# Patient Record
Sex: MALE | Race: ASIAN | NOT HISPANIC OR LATINO | URBAN - METROPOLITAN AREA
[De-identification: names, ages, dates, MRNs, and addresses within clinical notes are randomized per-mention and may not be internally consistent; named-entity substitution may affect disease eponyms.]

---

## 2021-12-22 ENCOUNTER — APPOINTMENT (RX ONLY)
Dept: URBAN - METROPOLITAN AREA CLINIC 349 | Facility: CLINIC | Age: 21
Setting detail: DERMATOLOGY
End: 2021-12-22

## 2021-12-22 DIAGNOSIS — Q828 OTHER SPECIFIED ANOMALIES OF SKIN: ICD-10-CM

## 2021-12-22 DIAGNOSIS — Q826 OTHER SPECIFIED ANOMALIES OF SKIN: ICD-10-CM

## 2021-12-22 DIAGNOSIS — L73.0 ACNE KELOID: ICD-10-CM

## 2021-12-22 DIAGNOSIS — Q819 OTHER SPECIFIED ANOMALIES OF SKIN: ICD-10-CM

## 2021-12-22 PROBLEM — L85.8 OTHER SPECIFIED EPIDERMAL THICKENING: Status: ACTIVE | Noted: 2021-12-22

## 2021-12-22 PROCEDURE — 99203 OFFICE O/P NEW LOW 30 MIN: CPT

## 2021-12-22 PROCEDURE — ? FULL BODY SKIN EXAM - DECLINED

## 2021-12-22 PROCEDURE — ? PRESCRIPTION

## 2021-12-22 PROCEDURE — ? COUNSELING

## 2021-12-22 RX ORDER — CLOBETASOL PROPIONATE 0.5 MG/ML
SOLUTION TOPICAL QHS
Qty: 50 | Refills: 6 | Status: ERX | COMMUNITY
Start: 2021-12-22

## 2021-12-22 RX ORDER — TRIAMCINOLONE ACETONIDE 0.25 MG/G
CREAM TOPICAL
Qty: 454 | Refills: 2 | Status: ERX | COMMUNITY
Start: 2021-12-22

## 2021-12-22 RX ORDER — MINOCYCLINE HYDROCHLORIDE 100 MG/1
CAPSULE ORAL BID
Qty: 60 | Refills: 3 | Status: ERX | COMMUNITY
Start: 2021-12-22

## 2021-12-22 RX ORDER — AMMONIUM LACTATE 12 G/100G
LOTION TOPICAL
Qty: 1 | Refills: 11 | Status: ERX | COMMUNITY
Start: 2021-12-22

## 2021-12-22 RX ADMIN — MINOCYCLINE HYDROCHLORIDE: 100 CAPSULE ORAL at 00:00

## 2021-12-22 RX ADMIN — AMMONIUM LACTATE: 12 LOTION TOPICAL at 00:00

## 2021-12-22 RX ADMIN — TRIAMCINOLONE ACETONIDE: 0.25 CREAM TOPICAL at 00:00

## 2021-12-22 RX ADMIN — CLOBETASOL PROPIONATE: 0.5 SOLUTION TOPICAL at 00:00

## 2021-12-22 ASSESSMENT — LOCATION ZONE DERM: LOCATION ZONE: ARM

## 2021-12-22 ASSESSMENT — LOCATION SIMPLE DESCRIPTION DERM: LOCATION SIMPLE: LEFT UPPER ARM

## 2021-12-22 ASSESSMENT — LOCATION DETAILED DESCRIPTION DERM: LOCATION DETAILED: LEFT ANTERIOR DISTAL UPPER ARM

## 2022-02-14 ENCOUNTER — APPOINTMENT (OUTPATIENT)
Dept: GENERAL RADIOLOGY | Age: 22
End: 2022-02-14
Attending: EMERGENCY MEDICINE
Payer: MEDICAID

## 2022-02-14 ENCOUNTER — HOSPITAL ENCOUNTER (EMERGENCY)
Age: 22
Discharge: HOME | End: 2022-02-14
Attending: EMERGENCY MEDICINE
Payer: MEDICAID

## 2022-02-14 VITALS
OXYGEN SATURATION: 100 % | RESPIRATION RATE: 17 BRPM | DIASTOLIC BLOOD PRESSURE: 84 MMHG | SYSTOLIC BLOOD PRESSURE: 140 MMHG | HEART RATE: 69 BPM | WEIGHT: 245 LBS | HEIGHT: 71 IN | TEMPERATURE: 97.8 F | BODY MASS INDEX: 34.3 KG/M2

## 2022-02-14 DIAGNOSIS — F41.1 ANXIETY STATE: Primary | ICD-10-CM

## 2022-02-14 DIAGNOSIS — R00.2 PALPITATIONS: ICD-10-CM

## 2022-02-14 LAB
ALBUMIN SERPL-MCNC: 4 G/DL (ref 3.5–5)
ALBUMIN/GLOB SERPL: 1 {RATIO} (ref 1.2–3.5)
ALP SERPL-CCNC: 110 U/L (ref 50–136)
ALT SERPL-CCNC: 27 U/L (ref 12–65)
ANION GAP SERPL CALC-SCNC: 7 MMOL/L (ref 7–16)
AST SERPL-CCNC: 20 U/L (ref 15–37)
ATRIAL RATE: 51 BPM
BASOPHILS # BLD: 0 K/UL (ref 0–0.2)
BASOPHILS NFR BLD: 0 % (ref 0–2)
BILIRUB SERPL-MCNC: 0.7 MG/DL (ref 0.2–1.1)
BUN SERPL-MCNC: 12 MG/DL (ref 6–23)
CALCIUM SERPL-MCNC: 9.5 MG/DL (ref 8.3–10.4)
CALCULATED P AXIS, ECG09: 41 DEGREES
CALCULATED R AXIS, ECG10: -3 DEGREES
CALCULATED T AXIS, ECG11: 8 DEGREES
CHLORIDE SERPL-SCNC: 106 MMOL/L (ref 98–107)
CO2 SERPL-SCNC: 24 MMOL/L (ref 21–32)
CREAT SERPL-MCNC: 0.9 MG/DL (ref 0.8–1.5)
DIAGNOSIS, 93000: NORMAL
DIFFERENTIAL METHOD BLD: ABNORMAL
EOSINOPHIL # BLD: 0 K/UL (ref 0–0.8)
EOSINOPHIL NFR BLD: 0 % (ref 0.5–7.8)
ERYTHROCYTE [DISTWIDTH] IN BLOOD BY AUTOMATED COUNT: 12.6 % (ref 11.9–14.6)
GLOBULIN SER CALC-MCNC: 4.2 G/DL (ref 2.3–3.5)
GLUCOSE SERPL-MCNC: 86 MG/DL (ref 65–100)
HCT VFR BLD AUTO: 48.4 % (ref 41.1–50.3)
HGB BLD-MCNC: 16 G/DL (ref 13.6–17.2)
IMM GRANULOCYTES # BLD AUTO: 0 K/UL (ref 0–0.5)
IMM GRANULOCYTES NFR BLD AUTO: 0 % (ref 0–5)
LIPASE SERPL-CCNC: 89 U/L (ref 73–393)
LYMPHOCYTES # BLD: 1.9 K/UL (ref 0.5–4.6)
LYMPHOCYTES NFR BLD: 21 % (ref 13–44)
MCH RBC QN AUTO: 29.3 PG (ref 26.1–32.9)
MCHC RBC AUTO-ENTMCNC: 33.1 G/DL (ref 31.4–35)
MCV RBC AUTO: 88.5 FL (ref 79.6–97.8)
MONOCYTES # BLD: 0.6 K/UL (ref 0.1–1.3)
MONOCYTES NFR BLD: 7 % (ref 4–12)
NEUTS SEG # BLD: 6.6 K/UL (ref 1.7–8.2)
NEUTS SEG NFR BLD: 72 % (ref 43–78)
NRBC # BLD: 0 K/UL (ref 0–0.2)
P-R INTERVAL, ECG05: 168 MS
PLATELET # BLD AUTO: 301 K/UL (ref 150–450)
PMV BLD AUTO: 9.4 FL (ref 9.4–12.3)
POTASSIUM SERPL-SCNC: 4 MMOL/L (ref 3.5–5.1)
PROT SERPL-MCNC: 8.2 G/DL (ref 6.3–8.2)
Q-T INTERVAL, ECG07: 414 MS
QRS DURATION, ECG06: 92 MS
QTC CALCULATION (BEZET), ECG08: 381 MS
RBC # BLD AUTO: 5.47 M/UL (ref 4.23–5.6)
SODIUM SERPL-SCNC: 137 MMOL/L (ref 138–145)
TROPONIN-HIGH SENSITIVITY: 6.2 PG/ML (ref 0–14)
VENTRICULAR RATE, ECG03: 51 BPM
WBC # BLD AUTO: 9.2 K/UL (ref 4.3–11.1)

## 2022-02-14 PROCEDURE — 99284 EMERGENCY DEPT VISIT MOD MDM: CPT

## 2022-02-14 PROCEDURE — 71046 X-RAY EXAM CHEST 2 VIEWS: CPT

## 2022-02-14 PROCEDURE — 84484 ASSAY OF TROPONIN QUANT: CPT

## 2022-02-14 PROCEDURE — 83690 ASSAY OF LIPASE: CPT

## 2022-02-14 PROCEDURE — 80053 COMPREHEN METABOLIC PANEL: CPT

## 2022-02-14 PROCEDURE — 93005 ELECTROCARDIOGRAM TRACING: CPT

## 2022-02-14 PROCEDURE — 85025 COMPLETE CBC W/AUTO DIFF WBC: CPT

## 2022-02-14 PROCEDURE — 83735 ASSAY OF MAGNESIUM: CPT

## 2022-02-14 PROCEDURE — 94762 N-INVAS EAR/PLS OXIMTRY CONT: CPT

## 2022-02-14 RX ORDER — SODIUM CHLORIDE 0.9 % (FLUSH) 0.9 %
5-10 SYRINGE (ML) INJECTION EVERY 8 HOURS
Status: DISCONTINUED | OUTPATIENT
Start: 2022-02-14 | End: 2022-02-14 | Stop reason: HOSPADM

## 2022-02-14 RX ORDER — SODIUM CHLORIDE 0.9 % (FLUSH) 0.9 %
5-10 SYRINGE (ML) INJECTION AS NEEDED
Status: DISCONTINUED | OUTPATIENT
Start: 2022-02-14 | End: 2022-02-14 | Stop reason: HOSPADM

## 2022-02-14 NOTE — ED TRIAGE NOTES
Patient ambulatory to triage with mask in place. Patient reports palpitation and n/v. Pt also reports not being able to sleep well at night and waking up gasping for air. Pt has been seen by PCP for issues and been referred to cardiology and for sleep study but has not had follow up with them yet.

## 2022-02-14 NOTE — ED NOTES
I have reviewed discharge instructions with the patient and caregiver. The patient and caregiver verbalized understanding. Patient left ED via Discharge Method: ambulatory to Home with family    Opportunity for questions and clarification provided. Patient given 0 scripts. To continue your aftercare when you leave the hospital, you may receive an automated call from our care team to check in on how you are doing. This is a free service and part of our promise to provide the best care and service to meet your aftercare needs.  If you have questions, or wish to unsubscribe from this service please call [phone]. Thank you for Choosing our [facility] Emergency Department.

## 2022-02-14 NOTE — DISCHARGE INSTRUCTIONS
Follow-up with your PCP to discuss status of both sleep study and cardiology referral. Discussed the possible 4 to 6-week period of time to see improvement with the Lexapro. Do not increase your dose of Lexapro prior to talking to your PCP.

## 2022-02-14 NOTE — ED PROVIDER NOTES
Mask was worn during the entire patient examination. Jenni Wheat is a 24 y.o. male who presents to the ED with a chief complaint of palpitations. Patient recently began dealing with this over the past week and a half. She states approximate month ago he had a breakthrough seizure for which he is on medications. He saw neurology and had ordered an MRI of his brain which was giving him anxiety. He states he also was told he needed to have a sleep study done but has heard nothing more on the progress of that. States when he went to have the MRI done they got an EKG and told him they saw something abnormal and sent him to his primary care to follow-up. ECP repeated the EKG and once again told him something was wrong with it hurting his heart rate and placed a referral to cardiology for portable monitor. Patient states he has not heard or been updated on the status of these things and his anxiety has gotten worse. He states he thinks that something is really wrong since he has not heard anything and this increases his palpitations. He also reports he was recently started on Lexapro 5 mg once daily for his anxiety states he does not think it is working. patient denies having any chest pain, sob, headache, syncope, or other symptoms. The history is provided by the patient and a relative. No past medical history on file. No past surgical history on file. No family history on file.     Social History     Socioeconomic History    Marital status: SINGLE     Spouse name: Not on file    Number of children: Not on file    Years of education: Not on file    Highest education level: Not on file   Occupational History    Not on file   Tobacco Use    Smoking status: Never Smoker    Smokeless tobacco: Not on file   Substance and Sexual Activity    Alcohol use: No    Drug use: Not on file    Sexual activity: Not on file   Other Topics Concern    Not on file   Social History Narrative    Not on file     Social Determinants of Health     Financial Resource Strain:     Difficulty of Paying Living Expenses: Not on file   Food Insecurity:     Worried About Running Out of Food in the Last Year: Not on file    Connie of Food in the Last Year: Not on file   Transportation Needs:     Lack of Transportation (Medical): Not on file    Lack of Transportation (Non-Medical): Not on file   Physical Activity:     Days of Exercise per Week: Not on file    Minutes of Exercise per Session: Not on file   Stress:     Feeling of Stress : Not on file   Social Connections:     Frequency of Communication with Friends and Family: Not on file    Frequency of Social Gatherings with Friends and Family: Not on file    Attends Sabianism Services: Not on file    Active Member of [address] [place] [address] [employer] or Organizations: Not on file    Attends Club or Organization Meetings: Not on file    Marital Status: Not on file   Intimate Partner Violence:     Fear of Current or Ex-Partner: Not on file    Emotionally Abused: Not on file    Physically Abused: Not on file    Sexually Abused: Not on file   Housing Stability:     Unable to Pay for Housing in the Last Year: Not on file    Number of [place] in the Last Year: Not on file    Unstable Housing in the Last Year: Not on file         ALLERGIES: Ibuprofen    Review of Systems   Constitutional: Negative for chills, diaphoresis and fatigue. HENT: Negative for rhinorrhea and voice change. Eyes: Negative for redness and visual disturbance. Respiratory: Negative for cough and shortness of breath. Cardiovascular: Positive for palpitations. Negative for chest pain. Gastrointestinal: Negative for abdominal pain. Musculoskeletal: Negative for arthralgias, gait problem and myalgias. Skin: Negative. Neurological: Negative for dizziness and headaches. Psychiatric/Behavioral: Negative for agitation and behavioral problems.         Moderate anxiety   All other systems reviewed and are negative. Vitals:    02/14/22 1342 02/14/22 1430 02/14/22 1445 02/14/22 1454   BP: (!) 161/71 (!) 147/99 (!) 148/85    Pulse: (!) 49 (!) 59 (!) 55 69   Resp: 16 23 19 15   Temp: 97.8 °F (36.6 °C)      SpO2: 100%   100%   Weight: 111.1 kg (245 lb)      Height: 5' 11\" (1.803 m)               Physical Exam  Vitals and nursing note reviewed. Constitutional:       Appearance: Normal appearance. HENT:      Head: Normocephalic and atraumatic. Right Ear: External ear normal.      Left Ear: External ear normal.   Eyes:      Extraocular Movements: Extraocular movements intact. Conjunctiva/sclera: Conjunctivae normal.   Cardiovascular:      Rate and Rhythm: Normal rate and regular rhythm. Pulses: Normal pulses. Heart sounds: Normal heart sounds. Pulmonary:      Effort: Pulmonary effort is normal.      Breath sounds: Normal breath sounds. Abdominal:      General: Abdomen is flat. Musculoskeletal:         General: Normal range of motion. Cervical back: Normal range of motion. Skin:     General: Skin is warm and dry. Capillary Refill: Capillary refill takes less than 2 seconds. Neurological:      General: No focal deficit present. Mental Status: He is alert and oriented to person, place, and time. Psychiatric:         Mood and Affect: Mood normal.         Behavior: Behavior normal.          MDM  Number of Diagnoses or Management Options  Anxiety state: new and does not require workup  Palpitations: new and requires workup  Diagnosis management comments: Mr. Javi Chowdhury is a 19-year-old male who presented to facility today with complaint of palpitations over the past week. Patient admits that he has had a lot of stress and anxiety over the past month due to different health situations that are currently being addressed by his PCP.   Patient's physical examination the department here today is completely normal.  Patient's EKG demonstrated sinus bradycardia is already being addressed by his PCP and a referral is in place to cardiology for portable monitoring at home. Patient's troponin and basic labs are within normal limits here today. Patient has moderate anxiety which is clearly noted on interviewing the patient. Patient reports feeling more comfortable following discussion with him here today. Patient will follow up with his PCP as discussed. Patient ambulatory upon discharge here today in stable condition. Amount and/or Complexity of Data Reviewed  Clinical lab tests: ordered and reviewed  Tests in the radiology section of CPT®: ordered and reviewed  Tests in the medicine section of CPT®: ordered and reviewed  Review and summarize past medical records: yes  Discuss the patient with other providers: yes (Dr. Jose Don)  Independent visualization of images, tracings, or specimens: yes    Risk of Complications, Morbidity, and/or Mortality  Presenting problems: moderate  Diagnostic procedures: moderate  Management options: moderate  General comments: ===================================  ED EKG Interpretation  EKG was interpreted in the absence of a cardiologist.    EKG rhythm: sinus bradycardia  Rate: 51  ST Segments: Nonspecific ST segments - NO STEMI    Kd Edward, PA    XR CHEST PA LAT   Final Result    1. No acute cardiopulmonary abnormality. The patient was observed in the ED.     Results Reviewed:      Recent Results (from the past 24 hour(s))  -TROPONIN-HIGH SENSITIVITY:   Collection Time: 02/14/22  1:48 PM       Result                      Value             Ref Range           Troponin-High Sensitiv*     6.2               0 - 14 pg/mL   -CBC WITH AUTOMATED DIFF:   Collection Time: 02/14/22  1:48 PM       Result                      Value             Ref Range           WBC                         9.2               4.3 - 11.1 K*       RBC                         5.47              4.23 - 5.6 M*       HGB                         16.0              13.6 - 17.2 *       HCT                         48.4              41.1 - 50.3 %       MCV                         88.5              79.6 - 97.8 *       MCH                         29.3              26.1 - 32.9 *       MCHC                        33.1              31.4 - 35.0 *       RDW                         12.6              11.9 - 14.6 %       PLATELET                    301               150 - 450 K/*       MPV                         9.4               9.4 - 12.3 FL       ABSOLUTE NRBC               0.00              0.0 - 0.2 K/*       DF                          AUTOMATED                             NEUTROPHILS                 72                43 - 78 %           LYMPHOCYTES                 21                13 - 44 %           MONOCYTES                   7                 4.0 - 12.0 %        EOSINOPHILS                 0 (L)             0.5 - 7.8 %         BASOPHILS                   0                 0.0 - 2.0 %         IMMATURE GRANULOCYTES       0                 0.0 - 5.0 %         ABS. NEUTROPHILS            6.6               1.7 - 8.2 K/*       ABS. LYMPHOCYTES            1.9               0.5 - 4.6 K/*       ABS. MONOCYTES              0.6               0.1 - 1.3 K/*       ABS. EOSINOPHILS            0.0               0.0 - 0.8 K/*       ABS. BASOPHILS              0.0               0.0 - 0.2 K/*       ABS. IMM.  GRANS.            0.0               0.0 - 0.5 K/*  -METABOLIC PANEL, COMPREHENSIVE:   Collection Time: 02/14/22  1:48 PM       Result                      Value             Ref Range           Sodium                      137 (L)           138 - 145 mm*       Potassium                   4.0               3.5 - 5.1 mm*       Chloride                    106               98 - 107 mmo*       CO2                         24                21 - 32 mmol*       Anion gap                   7                 7 - 16 mmol/L       Glucose                     86                65 - 100 mg/*       BUN 12                6 - 23 MG/DL        Creatinine                  0.90              0.8 - 1.5 MG*       GFR est AA                  >60               >60 ml/min/1*       GFR est non-AA              >60               >60 ml/min/1*       Calcium                     9.5               8.3 - 10.4 M*       Bilirubin, total            0.7               0.2 - 1.1 MG*       ALT (SGPT)                  27                12 - 65 U/L         AST (SGOT)                  20                15 - 37 U/L         Alk.  phosphatase            110               50 - 136 U/L        Protein, total              8.2               6.3 - 8.2 g/*       Albumin                     4.0               3.5 - 5.0 g/*       Globulin                    4.2 (H)           2.3 - 3.5 g/*       A-G Ratio                   1.0 (L)           1.2 - 3.5      -LIPASE:   Collection Time: 02/14/22  1:48 PM       Result                      Value             Ref Range           Lipase                      89                73 - 393 U/L   -EKG, 12 LEAD, INITIAL:   Collection Time: 02/14/22  1:51 PM       Result                      Value             Ref Range           Ventricular Rate            51                BPM                 Atrial Rate                 51                BPM                 P-R Interval                168               ms                  QRS Duration                92                ms                  Q-T Interval                414               ms                  QTC Calculation (Bezet)     381               ms                  Calculated P Axis           41                degrees             Calculated R Axis           -3                degrees             Calculated T Axis           8                 degrees             Diagnosis                                                     Sinus bradycardia with sinus arrhythmia Minimal voltage criteria for LVH, may be normal variant ( R in aVL ) Cannot rule out Anterior infarct , age undetermined Abnormal ECG No previous ECGs available     I discussed the results of all labs, procedures, radiographs, and treatments with the patient and available family. Treatment plan is agreed upon and the patient is ready for discharge. All voiced understanding of the discharge plan and medication instructions or changes as appropriate. Questions about treatment in the ED were answered. All were encouraged to return should symptoms worsen or new problems develop.            Patient Progress  Patient progress: stable         Procedures

## 2022-02-14 NOTE — Clinical Note
[facility] EMERGENCY DEPT  [address] [facility] [address]  [phone]    Work/School Note    Date: 2/14/2022    To Whom It May concern:      Mauri Garsia was seen and treated today in the emergency room by the following provider(s):  Attending Provider: Jass Freed MD  Physician Assistant: Augusto Edward PA. Mauri Garsia is excused from work/school on 02/14/22. He is clear to return to work/school on 02/15/22.         Sincerely,          DAMIR Torre

## 2022-02-15 LAB — MAGNESIUM SERPL-MCNC: 2.1 MG/DL (ref 1.6–2.3)

## 2022-03-09 ENCOUNTER — APPOINTMENT (RX ONLY)
Dept: URBAN - METROPOLITAN AREA CLINIC 329 | Facility: CLINIC | Age: 22
Setting detail: DERMATOLOGY
End: 2022-03-09

## 2022-03-09 DIAGNOSIS — L73.0 ACNE KELOID: ICD-10-CM | Status: STABLE

## 2022-03-09 DIAGNOSIS — Q826 OTHER SPECIFIED ANOMALIES OF SKIN: ICD-10-CM | Status: STABLE

## 2022-03-09 DIAGNOSIS — Q828 OTHER SPECIFIED ANOMALIES OF SKIN: ICD-10-CM | Status: STABLE

## 2022-03-09 DIAGNOSIS — Q819 OTHER SPECIFIED ANOMALIES OF SKIN: ICD-10-CM | Status: STABLE

## 2022-03-09 PROBLEM — L85.8 OTHER SPECIFIED EPIDERMAL THICKENING: Status: ACTIVE | Noted: 2022-03-09

## 2022-03-09 PROCEDURE — ? OTHER

## 2022-03-09 PROCEDURE — 99213 OFFICE O/P EST LOW 20 MIN: CPT

## 2022-03-09 PROCEDURE — ? COUNSELING

## 2022-03-09 PROCEDURE — ? PRESCRIPTION MEDICATION MANAGEMENT

## 2022-03-09 PROCEDURE — ? FULL BODY SKIN EXAM - DECLINED

## 2022-03-09 PROCEDURE — ? PRESCRIPTION

## 2022-03-09 RX ORDER — CLOBETASOL PROPIONATE 0.5 MG/ML
SOLUTION TOPICAL QHS
Qty: 50 | Refills: 6 | Status: ERX | COMMUNITY
Start: 2022-03-09

## 2022-03-09 RX ADMIN — CLOBETASOL PROPIONATE: 0.5 SOLUTION TOPICAL at 00:00

## 2022-03-09 ASSESSMENT — LOCATION DETAILED DESCRIPTION DERM
LOCATION DETAILED: LEFT DISTAL LATERAL POSTERIOR UPPER ARM
LOCATION DETAILED: RIGHT DISTAL POSTERIOR UPPER ARM
LOCATION DETAILED: LEFT ANTERIOR DISTAL UPPER ARM
LOCATION DETAILED: MID POSTERIOR NECK

## 2022-03-09 ASSESSMENT — LOCATION ZONE DERM
LOCATION ZONE: NECK
LOCATION ZONE: ARM

## 2022-03-09 ASSESSMENT — LOCATION SIMPLE DESCRIPTION DERM
LOCATION SIMPLE: POSTERIOR NECK
LOCATION SIMPLE: RIGHT UPPER ARM
LOCATION SIMPLE: LEFT UPPER ARM

## 2022-03-09 NOTE — PROCEDURE: OTHER
Detail Level: Zone
Render Risk Assessment In Note?: no
Note Text (......Xxx Chief Complaint.): This diagnosis correlates with the
Other (Free Text): Patient stated that he had stopped the antibiotic due to anxiety and being placed on other medications, he stated that he wasn’t eating much during that time. \\n\\nPatient is currently using the topical solution and the Triamcinolone cream. Dr. Ford advised him to stop using the Triamcinolone on his scalp.

## 2022-03-09 NOTE — PROCEDURE: PRESCRIPTION MEDICATION MANAGEMENT
Render In Strict Bullet Format?: No
Discontinue Regimen: Minocycline
Detail Level: Zone
Continue Regimen: clobetasol 0.05 % scalp solution QHS\\nQuantity: 50.0 ml\\nSig: Apply once daily to affected areas at bedtime.

## 2022-12-11 ENCOUNTER — HOSPITAL ENCOUNTER (EMERGENCY)
Dept: GENERAL RADIOLOGY | Age: 22
Discharge: HOME | End: 2022-12-14
Payer: MEDICAID

## 2022-12-11 ENCOUNTER — HOSPITAL ENCOUNTER (EMERGENCY)
Age: 22
Discharge: HOME | End: 2022-12-11
Attending: EMERGENCY MEDICINE | Admitting: EMERGENCY MEDICINE
Payer: MEDICAID

## 2022-12-11 VITALS
WEIGHT: 230 LBS | OXYGEN SATURATION: 100 % | HEIGHT: 71 IN | TEMPERATURE: 98.2 F | SYSTOLIC BLOOD PRESSURE: 126 MMHG | HEART RATE: 43 BPM | BODY MASS INDEX: 32.2 KG/M2 | DIASTOLIC BLOOD PRESSURE: 96 MMHG | RESPIRATION RATE: 18 BRPM

## 2022-12-11 DIAGNOSIS — S61.221A LACERATION OF LEFT INDEX FINGER WITH FOREIGN BODY, NAIL DAMAGE STATUS UNSPECIFIED, INITIAL ENCOUNTER: Primary | ICD-10-CM

## 2022-12-11 PROCEDURE — 90471 IMMUNIZATION ADMIN: CPT | Performed by: EMERGENCY MEDICINE

## 2022-12-11 PROCEDURE — 6360000002 HC RX W HCPCS: Performed by: EMERGENCY MEDICINE

## 2022-12-11 PROCEDURE — 2500000003 HC RX 250 WO HCPCS: Performed by: EMERGENCY MEDICINE

## 2022-12-11 PROCEDURE — 99284 EMERGENCY DEPT VISIT MOD MDM: CPT | Performed by: EMERGENCY MEDICINE

## 2022-12-11 PROCEDURE — 96372 THER/PROPH/DIAG INJ SC/IM: CPT | Performed by: EMERGENCY MEDICINE

## 2022-12-11 PROCEDURE — 73140 X-RAY EXAM OF FINGER(S): CPT

## 2022-12-11 PROCEDURE — 12045 INTMD RPR N-HF/GENIT12.6-20: CPT | Performed by: EMERGENCY MEDICINE

## 2022-12-11 PROCEDURE — 2580000003 HC RX 258: Performed by: EMERGENCY MEDICINE

## 2022-12-11 PROCEDURE — 90714 TD VACC NO PRESV 7 YRS+ IM: CPT | Performed by: EMERGENCY MEDICINE

## 2022-12-11 PROCEDURE — 6370000000 HC RX 637 (ALT 250 FOR IP): Performed by: EMERGENCY MEDICINE

## 2022-12-11 RX ORDER — HYDROCODONE BITARTRATE AND ACETAMINOPHEN 5; 325 MG/1; MG/1
1 TABLET ORAL
Status: COMPLETED | OUTPATIENT
Start: 2022-12-11 | End: 2022-12-11

## 2022-12-11 RX ORDER — TETANUS AND DIPHTHERIA TOXOIDS ADSORBED 2; 2 [LF]/.5ML; [LF]/.5ML
0.5 INJECTION INTRAMUSCULAR
Status: COMPLETED | OUTPATIENT
Start: 2022-12-11 | End: 2022-12-11

## 2022-12-11 RX ORDER — LIDOCAINE HYDROCHLORIDE 10 MG/ML
5 INJECTION, SOLUTION INFILTRATION; PERINEURAL
Status: COMPLETED | OUTPATIENT
Start: 2022-12-11 | End: 2022-12-11

## 2022-12-11 RX ORDER — BUPIVACAINE HYDROCHLORIDE 5 MG/ML
30 INJECTION, SOLUTION EPIDURAL; INTRACAUDAL
Status: COMPLETED | OUTPATIENT
Start: 2022-12-11 | End: 2022-12-11

## 2022-12-11 RX ORDER — CEPHALEXIN 500 MG/1
500 CAPSULE ORAL 3 TIMES DAILY
Qty: 21 CAPSULE | Refills: 0 | Status: SHIPPED | OUTPATIENT
Start: 2022-12-11 | End: 2022-12-18

## 2022-12-11 RX ORDER — HYDROCODONE BITARTRATE AND ACETAMINOPHEN 5; 325 MG/1; MG/1
1 TABLET ORAL EVERY 6 HOURS PRN
Qty: 14 TABLET | Refills: 0 | Status: SHIPPED | OUTPATIENT
Start: 2022-12-11 | End: 2022-12-15

## 2022-12-11 RX ADMIN — TETANUS AND DIPHTHERIA TOXOIDS ADSORBED 0.5 ML: 2; 2 INJECTION INTRAMUSCULAR at 18:07

## 2022-12-11 RX ADMIN — WATER 1000 MG: 1 INJECTION INTRAMUSCULAR; INTRAVENOUS; SUBCUTANEOUS at 22:09

## 2022-12-11 RX ADMIN — LIDOCAINE HYDROCHLORIDE 5 ML: 10 INJECTION, SOLUTION INFILTRATION; PERINEURAL at 18:06

## 2022-12-11 RX ADMIN — HYDROCODONE BITARTRATE AND ACETAMINOPHEN 1 TABLET: 5; 325 TABLET ORAL at 22:08

## 2022-12-11 RX ADMIN — BUPIVACAINE HYDROCHLORIDE 150 MG: 5 INJECTION, SOLUTION EPIDURAL; INTRACAUDAL; PERINEURAL at 18:07

## 2022-12-11 ASSESSMENT — PAIN DESCRIPTION - LOCATION: LOCATION: FINGER (COMMENT WHICH ONE)

## 2022-12-11 ASSESSMENT — PAIN SCALES - GENERAL
PAINLEVEL_OUTOF10: 8

## 2022-12-11 ASSESSMENT — PAIN - FUNCTIONAL ASSESSMENT
PAIN_FUNCTIONAL_ASSESSMENT: 0-10
PAIN_FUNCTIONAL_ASSESSMENT: 0-10

## 2022-12-11 ASSESSMENT — PAIN DESCRIPTION - ORIENTATION: ORIENTATION: LEFT

## 2022-12-11 NOTE — ED PROVIDER NOTES
[facility] Emergency Department Provider Note                   PCP:                Jodi Chan MD               Age: 25 y.o. Sex: male       Raz Lake is a 25 y.o. male who presents to the Emergency Department with chief complaint of    Chief Complaint   Patient presents with    Laceration      Patient states that just prior to arrival he was using a saw when his left pointer finger got caught on it sustaining extensive laceration. He states his nail is intact. Patient has no sensation to the end of his finger. Patient does have full range of motion in his finger. Patient did not know when his last tetanus immunization was. Patient is right-handed. Patient has no other injuries or complaints. The history is provided by the patient. All other systems reviewed and are negative. Review of Systems   Musculoskeletal:  Positive for arthralgias and myalgias. Skin:  Positive for wound. Neurological:  Negative for weakness and numbness. No past medical history on file. No past surgical history on file. No family history on file. Social History     Socioeconomic History    Marital status: Single   Tobacco Use    Smoking status: Never   Substance and Sexual Activity    Alcohol use: No        Allergies: Ibuprofen    Discharge Medication List as of 12/11/2022 10:15 PM        CONTINUE these medications which have NOT CHANGED    Details   levETIRAcetam (KEPPRA) 500 MG tablet Take 500 mg by mouth 2 times dailyHistorical Med              Vitals signs and nursing note reviewed. Patient Vitals for the past 4 hrs:   Pulse Resp BP SpO2   12/11/22 2217 (!) 43 18 (!) 126/96 100 %          Physical Exam  Vitals and nursing note reviewed. Constitutional:       Appearance: Normal appearance. Musculoskeletal:         General: Swelling and tenderness present. Comments: See diagram.    Patient does have full flexion and extension of his left second phalanx.    Skin:     General: Skin is warm and dry. Capillary Refill: Capillary refill takes less than 2 seconds. Neurological:      Mental Status: He is alert and oriented to person, place, and time. Comments: Decreased sensation to left second distal phalanx distal to the laceration.                   Orders Placed This Encounter   Procedures    LACERATION REPAIR    Aluminum Finger Splint  (Alumifoam)    XR FINGER LEFT (MIN 2 VIEWS)    Ambulatory referral to Hand Surgery       Lac Repair    Date/Time: 12/11/2022 8:15 PM  Performed by: Pina Espinal MD  Authorized by: Pina Esipnal MD     Consent:     Consent obtained:  Verbal    Risks, benefits, and alternatives were discussed: yes      Risks discussed:  Infection, pain, retained foreign body, need for additional repair, poor cosmetic result, tendon damage, vascular damage, poor wound healing and nerve damage    Alternatives discussed:  No treatment  Universal protocol:     Procedure explained and questions answered to patient or proxy's satisfaction: yes      Patient identity confirmed:  Verbally with patient  Anesthesia:     Anesthesia method:  Nerve block    Block location:  Proximal left 2nd phalanx    Block needle gauge:  24 G    Block anesthetic:  Lidocaine 1% w/o epi and bupivacaine 0.25% w/o epi (used 4 cc of 50:50 mixture)    Block injection procedure:  Anatomic landmarks identified, introduced needle, incremental injection, negative aspiration for blood and anatomic landmarks palpated    Block outcome:  Anesthesia achieved  Laceration details:     Location:  Finger    Finger location:  L index finger    Length (cm):  14  Pre-procedure details:     Preparation:  Patient was prepped and draped in usual sterile fashion and imaging obtained to evaluate for foreign bodies  Exploration:     Hemostasis achieved with:  Direct pressure    Imaging obtained: x-ray      Imaging outcome: foreign body noted      Wound exploration: wound explored through full range of motion and entire depth of wound visualized      Wound extent: foreign bodies/material, muscle damage, nerve damage and underlying fracture      Wound extent: no tendon damage noted and no vascular damage noted      Wound extent comment:  I did not visualize any obvious tendon injury but wound did go across flexor tendon area    Contaminated: no    Treatment:     Area cleansed with:  Povidone-iodine    Amount of cleaning:  Extensive    Irrigation solution:  Sterile saline    Irrigation method:  Syringe    Visualized foreign bodies/material removed: no    Skin repair:     Repair method:  Sutures    Suture size:  4-0    Suture material:  Nylon    Suture technique:  Simple interrupted    Number of sutures:  14  Approximation:     Approximation:  Close  Repair type:     Repair type: Intermediate  Post-procedure details:     Dressing:  Bulky dressing and non-adherent dressing    Procedure completion:  Tolerated well, no immediate complications  Comments:      45 minutes total time        Results Include:    No results found for this or any previous visit (from the past 24 hour(s)). XR FINGER LEFT (MIN 2 VIEWS)   Final Result   LARGE SOFT TISSUE LACERATION WITH PROBABLE CHIP FRACTURES VERSUS   RADIOPAQUE FOREIGN BODIES IN THE SOFT TISSUES ADJACENT TO THE RADIOVENTRAL   MARGIN OF THE MIDDLE PHALANGEAL NECK. ED Course as of 12/11/22 2223   Nelwyn Paget Dec 11, 2022   2010 Patient states his finger is still numb from the digital block. I updated him on the x-ray results and plan. [CW]   2136 I have been messaging with the orthopedic team.  They have recommended grams of Ancef in the ED, irrigate, bandage, and they will see patient in the outpatient hand clinic tomorrow. [CW]   2146 Patient is resting comfortably in bed. I explained the results and plan and patient is comfortable with discharge.  [CW]      ED Course User Index  [CW] Angelita Santana MD       MDM  Number of Diagnoses or Management Options  Laceration of left index finger with foreign body, nail damage status unspecified, initial encounter  Diagnosis management comments: Patient presented for extensive laceration to left finger after a saw accident. Patient had no nail involvement. Patient's x-ray showed 2 small ossicles which may be chip fractures versus retained foreign bodies. Patient had intact flexor and extensor tendon function. His wound however does extend over his flexor tendon and although I was not able to visualize any obvious injury, patient may have a partial tendon injury. I consulted with orthopedic team.  Patient's laceration was sutured by me in the ED. Patient was placed in a bulky dressing and in splint. Patient was given a dose of Ancef. The plan is for patient to be discharged home with Keflex, Norco, and to follow-up in the orthopedic hand clinic tomorrow. Explanation: The diagnosis and plan as well as the results of any testing (if done) and treatments (if done) today in the emergency department were communicated to the patient and/or their family/caregiver (if present). The patient/caregiver verbalized understanding and compliance with the treatment plan and reasons to return immediately to the emergency department. All questions were answered at bedside      ICD-10-CM    1. Laceration of left index finger with foreign body, nail damage status unspecified, initial encounter  S61.221A HYDROcodone-acetaminophen ([address]) 5-325 MG per tablet     Ambulatory referral to Hand Surgery          DISPOSITION Decision To Discharge 12/11/2022 10:20:17 PM       Voice dictation software was used during the making of this note. This software is not perfect and grammatical and other typographical errors may be present. This note has not been completely proofread for errors.      Delmi Rizzo MD  12/11/22 5078

## 2022-12-11 NOTE — ED TRIAGE NOTES
Pt arrives via pov ambulatory with fam member after laceration to the pointer finger with wood saw. Bleeding controlled.

## 2022-12-11 NOTE — LETTER
Stacey Benoit was seen and treated in our emergency department on 12/11/2022. He may return to work on 12/19/2022. If you have any questions or concerns, please don't hesitate to call.       Misha Landeros MD

## 2022-12-12 NOTE — ED NOTES
RN cleaned pt finger and dressed finger in bulky dressing then finger splint     Shelia Helms RN  12/11/22 6622

## 2022-12-12 NOTE — ED NOTES
I have reviewed discharge instructions with the patient. The patient verbalized understanding. Patient left ED via Discharge Method: ambulatory to Home with family. Opportunity for questions and clarification provided. Patient given 2 scripts. To continue your aftercare when you leave the hospital, you may receive an automated call from our care team to check in on how you are doing. This is a free service and part of our promise to provide the best care and service to meet your aftercare needs.  If you have questions, or wish to unsubscribe from this service please call [phone]. Thank you for Choosing our [facility] Emergency Department.           Saravanan Bella RN  12/11/22 4210

## 2022-12-14 ENCOUNTER — OFFICE VISIT (OUTPATIENT)
Dept: ORTHOPEDIC SURGERY | Age: 22
End: 2022-12-14
Payer: MEDICAID

## 2022-12-14 DIAGNOSIS — S61.211A LACERATION OF LEFT INDEX FINGER WITHOUT FOREIGN BODY WITHOUT DAMAGE TO NAIL, INITIAL ENCOUNTER: Primary | ICD-10-CM

## 2022-12-14 PROCEDURE — 99204 OFFICE O/P NEW MOD 45 MIN: CPT | Performed by: NURSE PRACTITIONER

## 2022-12-14 RX ORDER — CLINDAMYCIN HYDROCHLORIDE 300 MG/1
300 CAPSULE ORAL 3 TIMES DAILY
Qty: 30 CAPSULE | Refills: 0 | Status: SHIPPED | OUTPATIENT
Start: 2022-12-14 | End: 2022-12-24

## 2022-12-14 NOTE — PROGRESS NOTES
Orthopaedic Hand Clinic Note    Name: Owen Ledezma  YOB: 2000  Gender: male  MRN: [id number]      CC: Patient referred for evaluation of the left index finger injury    HPI: Owen Ledezma is a 25 y.o. male right hand dominant with a chief complaint of left index finger injury. He reports on Sunday, December 11, 2022 he was making a floating shelf. His finger got caught in a circular saw. He was seen at [facility] emergency room. He was x-rayed. He was given a tetanus shot. He was placed on Keflex 500 mg 3 times daily x7 days and Norco.    ROS/Meds/PSH/PMH/FH/SH: I personally reviewed the patients standard intake form. Pertinents are discussed in the HPI    Physical Examination:  General: Awake and alert. HEENT: Normocephalic, atraumatic  CV/Pulm: Breathing even and unlabored  Skin: No obvious rashes noted. Lymphatic: No obvious evidence of lymphedema or lymphadenopathy    Musculoskeletal Exam:  Examination on the left upper extremity demonstrates cap refill < 5 seconds in all fingers  Patient has a laceration to the left index finger. There are sutures. He does have some oozing bloody drainage. There is some erythema. He has decreased range of motion secondary to pain and swelling. It is hard to get a good examination. All tendons appear to be working. He has paresthesia at the tip of the finger. He has normal sensation to all other parts of the finger. He has good capillary refill. Imaging / Electrodiagnostic Tests:     X-ray includes a 3 view left index finger from the emergency room are independently reviewed and interpreted. Patient has a fracture through the middle phalanx. Assessment:   1. Laceration of left index finger without foreign body without damage to nail, initial encounter        Plan:   We discussed the diagnosis and different treatment options. We discussed observation, therapy, antiinflammatory medications and other pertinent treatment modalities.     After discussing in detail the patient elects to proceed with changing his antibiotics to clindamycin 300 mg 3 times daily. He denies needing any pain medication. He will do warm soapy soaks twice daily. We will do Xeroform dressing change today and after his warm soapy soaks. We will give him supplies. We will reassess his progress back on Friday. We have discussed in detail surgical intervention including nerve repair and exploration of wound and possible tendon repair. Patient voiced accordance and understanding of the information provided and the formulated plan. All questions were answered to the patient's satisfaction during the encounter.     4 This is an acute complicated injury  Treatment at this time: Prescription medication and dressing changes    EULALIA Conti - CNP  Orthopaedic Surgery  12/14/22  4:22 PM

## 2022-12-15 DIAGNOSIS — S61.211A LACERATION OF LEFT INDEX FINGER WITHOUT FOREIGN BODY WITHOUT DAMAGE TO NAIL, INITIAL ENCOUNTER: Primary | ICD-10-CM

## 2022-12-15 RX ORDER — CLINDAMYCIN HYDROCHLORIDE 300 MG/1
300 CAPSULE ORAL 3 TIMES DAILY
Qty: 30 CAPSULE | Refills: 0 | Status: SHIPPED | OUTPATIENT
Start: 2022-12-15 | End: 2022-12-25

## 2022-12-16 ENCOUNTER — OFFICE VISIT (OUTPATIENT)
Dept: ORTHOPEDIC SURGERY | Age: 22
End: 2022-12-16
Payer: MEDICAID

## 2022-12-16 DIAGNOSIS — S61.211A LACERATION OF LEFT INDEX FINGER WITHOUT FOREIGN BODY WITHOUT DAMAGE TO NAIL, INITIAL ENCOUNTER: Primary | ICD-10-CM

## 2022-12-16 PROCEDURE — 99214 OFFICE O/P EST MOD 30 MIN: CPT | Performed by: NURSE PRACTITIONER

## 2022-12-16 RX ORDER — MELOXICAM 15 MG/1
15 TABLET ORAL DAILY
Qty: 30 TABLET | Refills: 0 | Status: SHIPPED | OUTPATIENT
Start: 2022-12-16 | End: 2023-01-06

## 2022-12-16 NOTE — LETTER
[address] [address]  Phone: [phone]  Fax: EULALIA Benitez CNP        December 16, 2022     Patient: Ivana Gambino   YOB: 2000   Date of Visit: 12/16/2022       To Whom It May Concern: It is my medical opinion that Ivana Gambino should remain out of work until follow up appointment on Wednesday 12/21/2022. If you have any questions or concerns, please don't hesitate to call.     Sincerely,        EULALIA Gambino CNP

## 2022-12-16 NOTE — PROGRESS NOTES
Orthopaedic Hand Clinic Note    Name: Tiffanie Villegas  YOB: 2000  Gender: male  MRN: [id number]      Follow up visit:   1. Laceration of left index finger without foreign body without damage to nail, initial encounter        HPI: Tiffanie Villegas is a 25 y.o. male who is following up for left index finger laceration. He is taking the clindamycin without difficulty. He said his finger feels a little bit better. He says he is moving some better. He has been compliant with his warm soapy soaks and dressing changes. ROS/Meds/PSH/PMH/FH/SH: I personally reviewed the patients standard intake form. Pertinents are discussed in the HPI    Physical Examination:    Musculoskeletal Examination:  Examination on the left upper extremity demonstrates cap refill < 5 seconds in all fingers  Patient has a laceration to the left index finger. There are sutures. He does have some oozing bloody drainage. There is some erythema. He has decreased range of motion secondary to pain and swelling. It is hard to get a good examination. All tendons appear to be working. He has paresthesia at the tip of the finger. He has normal sensation to all other parts of the finger. He has good capillary refill. Imaging / Electrodiagnostic Tests:     none    Assessment:     ICD-10-CM    1. Laceration of left index finger without foreign body without damage to nail, initial encounter  S61.211A           Plan:   We discussed the diagnosis and different treatment options. We discussed observation, therapy, antiinflammatory medications and other pertinent treatment modalities. After discussing in detail the patient elects to proceed with continuing his antibiotics and anti-inflammatories. He will continue warm soapy soaks. He will do a Xeroform dressing change daily. Give him an out of work note. I will reassess his progress back next Wednesday. This will be 10 days out from injury.   We will check his wound and remove his sutures. .     Patient voiced accordance and understanding of the information provided and the formulated plan. All questions were answered to the patient's satisfaction during the encounter.     4 This is an acute complicated injury  Treatment at this time: Prescription medication and dressing changes    EULALIA Alvarado - CNP  Orthopaedic Surgery  12/16/22  11:43 AM

## 2022-12-21 ENCOUNTER — OFFICE VISIT (OUTPATIENT)
Dept: ORTHOPEDIC SURGERY | Age: 22
End: 2022-12-21
Payer: MEDICAID

## 2022-12-21 DIAGNOSIS — S61.211A LACERATION OF LEFT INDEX FINGER WITHOUT FOREIGN BODY WITHOUT DAMAGE TO NAIL, INITIAL ENCOUNTER: Primary | ICD-10-CM

## 2022-12-21 PROCEDURE — 99213 OFFICE O/P EST LOW 20 MIN: CPT | Performed by: NURSE PRACTITIONER

## 2022-12-21 NOTE — LETTER
[address] [address]  Phone: [phone]  Fax: EULALIA Benitez CNP        December 21, 2022     Patient: Ivana Gambino   YOB: 2000   Date of Visit: 12/21/2022       To Whom It May Concern: It is my medical opinion that Ivana Gambino may return to work on 12/26/2022. If you have any questions or concerns, please don't hesitate to call.     Sincerely,        EULALIA Gambino CNP

## 2022-12-21 NOTE — PROGRESS NOTES
Orthopaedic Hand Clinic Note    Name: Antonia Gonzalez  YOB: 2000  Gender: male  MRN: [id number]      Follow up visit:   1. Laceration of left index finger without foreign body without damage to nail, initial encounter        HPI: Antonia Gonzalez is a 25 y.o. male who is following up for left index finger laceration. He is 10 days out from injury. He has been on clindamycin. He said he has noted a rash under his arms and belly. He said it is itchy. He has been compliant with his finger soaks and dressing changes. ROS/Meds/PSH/PMH/FH/SH: I personally reviewed the patients standard intake form. Pertinents are discussed in the HPI    Physical Examination:    Musculoskeletal Examination:  Examination on the left upper extremity demonstrates cap refill < 5 seconds in all fingers  Patient has a laceration to the left index finger. There are sutures. He has no erythema or drainage. He has decreased range of motion secondary to pain and swelling. All tendons appear to be working. He has minimal paresthesia at the tip of the finger. He has normal sensation to all other parts of the finger. He has good capillary refill. Imaging / Electrodiagnostic Tests:     none    Assessment:     ICD-10-CM    1. Laceration of left index finger without foreign body without damage to nail, initial encounter  S61.211A           Plan:   We discussed the diagnosis and different treatment options. We discussed observation, therapy, antiinflammatory medications and other pertinent treatment modalities. After discussing in detail the patient elects to proceed with suture removal.  I have offered to change his antibiotics. He wants to finish out the last couple of days. I instructed him to take Benadryl as needed for itching. We will reassess his progress back in 2 weeks. We will give him a return to work note for December 26, 2022. Yair Wagner      Patient voiced accordance and understanding of the information provided and the formulated plan. All questions were answered to the patient's satisfaction during the encounter.     Treatment at this time:  activities as tolerated ; suture removal     EULALIA Rg CNP  Orthopaedic Surgery  12/21/22  12:17 PM

## 2022-12-29 ENCOUNTER — TELEPHONE (OUTPATIENT)
Dept: ORTHOPEDIC SURGERY | Age: 22
End: 2022-12-29

## 2022-12-29 NOTE — TELEPHONE ENCOUNTER
----- Message from Gerrianne Simmonds, APRN - CNP sent at 12/29/2022  8:49 AM EST -----  Can you please do a work note for this patient    Can go back to work today  Light duty  No lifting, pushing, pulling with left hand more than 5 lbs    Please email to [email or web address]    Thanks so much!     Mariaa

## 2023-01-04 ENCOUNTER — TELEPHONE (OUTPATIENT)
Dept: ORTHOPEDIC SURGERY | Age: 23
End: 2023-01-04

## 2023-01-05 ENCOUNTER — TELEPHONE (OUTPATIENT)
Dept: ORTHOPEDIC SURGERY | Age: 23
End: 2023-01-05

## 2023-01-27 ENCOUNTER — OFFICE VISIT (OUTPATIENT)
Dept: ORTHOPEDIC SURGERY | Age: 23
End: 2023-01-27

## 2023-01-27 DIAGNOSIS — S61.211A LACERATION OF LEFT INDEX FINGER WITHOUT FOREIGN BODY WITHOUT DAMAGE TO NAIL, INITIAL ENCOUNTER: Primary | ICD-10-CM

## 2023-01-27 NOTE — PROGRESS NOTES
Orthopaedic Hand Clinic Note    Name: Angelo Gutierrez  YOB: 2000  Gender: male  MRN: [id number]      Follow up visit:   1. Laceration of left index finger without foreign body without damage to nail, initial encounter        HPI: Angelo Gutierrez is a 25 y.o. male who is following up for left index finger laceration. He is 7 weeks out from injury. He said he is doing well. He said he is having some hypersensitivity and nerve pain. ROS/Meds/PSH/PMH/FH/SH: I personally reviewed the patients standard intake form. Pertinents are discussed in the HPI    Physical Examination:    Musculoskeletal Examination:  Examination on the left upper extremity demonstrates cap refill < 5 seconds in all fingers,   Patient has mild swelling to the left index finger. He has well-healed incisions. He is hypersensitive to touch over his scars. He is not able to quite make a composite fist with the index finger. He lacks full extension at the PIP joint. He notes paresthesia at the tip of the finger. He has good capillary refill. Imaging / Electrodiagnostic Tests:     X-rays include a 3 view left index finger are reviewed. Fracture is healed. Assessment:     ICD-10-CM    1. Laceration of left index finger without foreign body without damage to nail, initial encounter  S61.211A XR FINGER LEFT (MIN 2 VIEWS)          Plan:   We discussed the diagnosis and different treatment options. We discussed observation, therapy, antiinflammatory medications and other pertinent treatment modalities. After discussing in detail the patient elects to proceed with therapy. He says he needs to go to [facility] for therapy due to insurance reasons. They can work on scar massage and desensitization. They will also work on range of motion. We will give him a return to work note for full duty, no restrictions. I will see him back in 2 months.     Patient voiced accordance and understanding of the information provided and the formulated plan. All questions were answered to the patient's satisfaction during the encounter.     Treatment at this time:  [address], APRN - CNP  Orthopaedic Surgery  01/27/23  10:37 AM

## 2023-01-27 NOTE — LETTER
[facility]  [facility] [address]  Phone: [phone]  Fax: EULALIA Benitez CNP        January 27, 2023     Patient: Shubham Aguirre   YOB: 2000   Date of Visit: 1/27/2023       To Whom It May Concern:    Please excuse from work 1/23/2023-1/27/2023. He may return to work on Monday 1/30/2023 with no restrictions. If you have any questions or concerns, please don't hesitate to call.     Sincerely,        EULALIA Sher CNP